# Patient Record
Sex: MALE | Race: WHITE | NOT HISPANIC OR LATINO | URBAN - METROPOLITAN AREA
[De-identification: names, ages, dates, MRNs, and addresses within clinical notes are randomized per-mention and may not be internally consistent; named-entity substitution may affect disease eponyms.]

---

## 2018-05-17 ENCOUNTER — OFFICE (OUTPATIENT)
Dept: URBAN - METROPOLITAN AREA CLINIC 33 | Facility: CLINIC | Age: 73
End: 2018-05-17

## 2018-05-17 VITALS
WEIGHT: 243 LBS | SYSTOLIC BLOOD PRESSURE: 161 MMHG | HEIGHT: 68 IN | HEART RATE: 94 BPM | TEMPERATURE: 97 F | DIASTOLIC BLOOD PRESSURE: 88 MMHG

## 2018-05-17 DIAGNOSIS — K59.09 OTHER CONSTIPATION: ICD-10-CM

## 2018-05-17 DIAGNOSIS — Z86.010 PERSONAL HISTORY OF COLONIC POLYPS: ICD-10-CM

## 2018-05-17 DIAGNOSIS — K57.30 DIVERTICULOSIS OF LARGE INTESTINE WITHOUT PERFORATION OR ABS: ICD-10-CM

## 2018-05-17 PROCEDURE — 99203 OFFICE O/P NEW LOW 30 MIN: CPT

## 2018-10-30 ENCOUNTER — OFFICE (OUTPATIENT)
Dept: URBAN - METROPOLITAN AREA CLINIC 33 | Facility: CLINIC | Age: 73
End: 2018-10-30

## 2018-10-30 VITALS
HEIGHT: 68 IN | HEART RATE: 87 BPM | DIASTOLIC BLOOD PRESSURE: 88 MMHG | SYSTOLIC BLOOD PRESSURE: 149 MMHG | WEIGHT: 239 LBS | TEMPERATURE: 97.2 F

## 2018-10-30 DIAGNOSIS — R14.0 ABDOMINAL DISTENSION (GASEOUS): ICD-10-CM

## 2018-10-30 DIAGNOSIS — R14.2 ERUCTATION: ICD-10-CM

## 2018-10-30 DIAGNOSIS — R14.1 GAS PAIN: ICD-10-CM

## 2018-10-30 PROCEDURE — 99213 OFFICE O/P EST LOW 20 MIN: CPT

## 2018-10-30 NOTE — SERVICEHPINOTES
One week ago, he developed generalized mild discomfort, belching, and "gas". He went to his PCP who started him on Omeprazole 40 mg, Zantac. If he exercises he feels better. He was taking a lot of Aleve but stopped this as of late. He notes rare acid reflux. He notes increased flatulence. He notes mild discomfort in the lower abdomen but nothing continuous. No glaucoma. He is trying to cut out sugar this has resulted in more constipation. He inquires about whether Miralax is safe. No dysphagia, nausea, vomiting. He recently did Nutrasystem. He notes a lack of appetite and mild weight loss. Stools are a type 2 on the BSS. No black stool.

## 2018-12-11 ENCOUNTER — OFFICE (OUTPATIENT)
Dept: URBAN - METROPOLITAN AREA CLINIC 101 | Facility: CLINIC | Age: 73
End: 2018-12-11

## 2018-12-11 VITALS
SYSTOLIC BLOOD PRESSURE: 153 MMHG | WEIGHT: 245 LBS | HEART RATE: 99 BPM | HEIGHT: 68 IN | TEMPERATURE: 98.1 F | DIASTOLIC BLOOD PRESSURE: 103 MMHG

## 2018-12-11 DIAGNOSIS — K21.9 GASTRO-ESOPHAGEAL REFLUX DISEASE WITHOUT ESOPHAGITIS: ICD-10-CM

## 2018-12-11 DIAGNOSIS — R14.0 ABDOMINAL DISTENSION (GASEOUS): ICD-10-CM

## 2018-12-11 DIAGNOSIS — R14.2 ERUCTATION: ICD-10-CM

## 2018-12-11 PROCEDURE — 99214 OFFICE O/P EST MOD 30 MIN: CPT

## 2019-02-27 ENCOUNTER — OFFICE (OUTPATIENT)
Dept: URBAN - METROPOLITAN AREA CLINIC 33 | Facility: CLINIC | Age: 74
End: 2019-02-27

## 2019-02-27 VITALS
TEMPERATURE: 97.9 F | HEART RATE: 89 BPM | HEIGHT: 68 IN | DIASTOLIC BLOOD PRESSURE: 89 MMHG | SYSTOLIC BLOOD PRESSURE: 156 MMHG | WEIGHT: 246 LBS

## 2019-02-27 DIAGNOSIS — R19.4 CHANGE IN BOWEL HABIT: ICD-10-CM

## 2019-02-27 PROCEDURE — 99213 OFFICE O/P EST LOW 20 MIN: CPT

## 2019-02-27 NOTE — SERVICEHPINOTES
Mr. Domingo is here to discuss a change in bowel habits. He follows with Dr. Bear on a regular basis for this.    Last colonoscopy was 2014 and showed mild diverticulosis and a small polyp in descending colon (uncertain type). Previously had 1 BM/day, now having more frequent smaller-volume stools overall change began 6-8 months ago. Recently, he had an episode of worsening constipation followed by explosive diarrhea. Symptoms occurred after eating a meatball sub. He initially scheduled the office visit today to get tested for food poisoning. Now stools have returned back to his baseline. He is scheduled for a colonoscopy and EGD in April. He has no current GI complaints today.

## 2019-04-23 ENCOUNTER — ON CAMPUS - OUTPATIENT (OUTPATIENT)
Dept: URBAN - METROPOLITAN AREA HOSPITAL 63 | Facility: HOSPITAL | Age: 74
End: 2019-04-23
Payer: MEDICARE

## 2019-04-23 DIAGNOSIS — K21.9 GASTRO-ESOPHAGEAL REFLUX DISEASE WITHOUT ESOPHAGITIS: ICD-10-CM

## 2019-04-23 DIAGNOSIS — Z86.010 PERSONAL HISTORY OF COLONIC POLYPS: ICD-10-CM

## 2019-04-23 DIAGNOSIS — K20.8 OTHER ESOPHAGITIS: ICD-10-CM

## 2019-04-23 PROCEDURE — G0105 COLORECTAL SCRN; HI RISK IND: HCPCS

## 2019-04-23 PROCEDURE — 43239 EGD BIOPSY SINGLE/MULTIPLE: CPT
